# Patient Record
(demographics unavailable — no encounter records)

---

## 2025-01-29 NOTE — DISCUSSION/SUMMARY
[de-identified] : Patient is a 70 year old male who presents for follow up of Left TKA from 12/23 and MARI from April 2024.  His implants are stable on x-ray. He does report some occasional discomfort and subjective stiffness with terminal knee flexion. Pt understands the importance of prophylaxis for invasive dental procedures. He should continue to do low impact exercises. F/u 5 years from surgery.

## 2025-01-29 NOTE — HISTORY OF PRESENT ILLNESS
[Improving] : improving [2] : a current pain level of 2/10 [1] : a minimum pain level of 1/10 [4] : a maximum pain level of 4/10 [de-identified] : Patient is a 70 year old male who presents for follow up of Left TKA from 12/23 and MARI from April 2024.  At this point, he states that he is able to perform his ADLs.  However, he does report some intermittent mild dull aching pain over the anterior medial and anterior lateral aspects of the knee.  Associated with some residual stiffness with terminal flexion.  He denies any complaints of instability.  No significant swelling, warmth or erythema of the knee.  No radicular pain or paresthesia.

## 2025-01-29 NOTE — PHYSICAL EXAM
[LE] : Sensory: Intact in bilateral lower extremities [ALL] : dorsalis pedis, posterior tibial, femoral, popliteal, and radial 2+ and symmetric bilaterally [Normal] : Alert and in no acute distress [Poor Appearance] : well-appearing [de-identified] : GENERAL APPEARANCE: Well nourished and hydrated, pleasant, alert, and oriented x 3. Appears their stated age. HEENT: Normocephalic, extraocular eye motion intact. Nasal septum midline. Oral cavity clear. External auditory canal clear. RESPIRATORY: Breath sounds clear and audible in all lobes. No wheezing, No accessory muscle use. CARDIOVASCULAR: No apparent abnormalities. No lower leg edema. No varicosities. Pedal pulses are palpable. NEUROLOGIC: Sensation is normal, no muscle weakness in the upper or lower extremities. DERMATOLOGIC: No apparent skin lesions, moist, warm, no rash. SPINE: Cervical spine appears normal and moves freely; thoracic spine appears normal and moves freely; lumbosacral spine appears normal and moves freely, normal, nontender. MUSCULOSKELETAL: Hands, wrists, and elbows are normal and move freely, shoulders are normal and move freely. [de-identified] : Left knee exam shows healed incision with no sign of infection.  No joint effusion, ROM 0-120, no gross instability, 5 out of 5 strength. [de-identified] : X rays were taken of the LEFT knee AP, LATERAL, SUNRISE VIEWS. No fracture, dislocation or loose body.   There is evidence of left total knee arthroplasty with implants in good position without evidence of loosening, wear or subsidence.

## 2025-01-29 NOTE — REASON FOR VISIT
[Follow-Up Visit] : a follow-up visit for [Other: ____] : [unfilled] [FreeTextEntry2] : s/p Left knee manipulation under anesthesia.DOS:04/02/24 POS: Left total knee arthroplasty.  [employer] Augmented Reality-assisted total knee arthroplasty. DOS: 12/14/2023.

## 2025-04-16 NOTE — ASSESSMENT
[FreeTextEntry1] : Annual physical: f/u routine labwork Benign neoplasm colon/hx of colon polyps: f/u GI DOLORES: c/w cpap, f/u pulmonology Obesity: Recommend low fat diet, wt loss, exercise, and DASH diet. CAD: s/p stents 2016, c/w aspirin as prescribed, f/u cardiology HTN: bp reviewed, c/w valsartan 80 mg po daily, low salt diet HLD: Recommend low fat diet, wt loss, exercise, nutritional counseling provided, f/u lipid panel ordered, c/w statin therapy Prediabetes: Recommend low carb diet, wt loss, exercise, f/u a1c ordered RTC 3 wks [Vaccines Reviewed] : Immunizations reviewed today. Please see immunization details in the vaccine log within the immunization flowsheet.

## 2025-04-16 NOTE — HISTORY OF PRESENT ILLNESS
[FreeTextEntry1] : NP-CPE [de-identified] : 71 yo male presents for annual physical. Reports feeling well. Denies fever, chills, cp, palpitations, sob, nvcd.

## 2025-04-16 NOTE — HEALTH RISK ASSESSMENT
[Very Good] : ~his/her~  mood as very good [Yes] : Yes [Monthly or less (1 pt)] : Monthly or less (1 point) [1 or 2 (0 pts)] : 1 or 2 (0 points) [Never (0 pts)] : Never (0 points) [No] : In the past 12 months have you used drugs other than those required for medical reasons? No [No falls in past year] : Patient reported no falls in the past year [0] : 2) Feeling down, depressed, or hopeless: Not at all (0) [PHQ-2 Negative - No further assessment needed] : PHQ-2 Negative - No further assessment needed [Audit-CScore] : 1 [de-identified] : needs improvement, walks [de-identified] : needs improvement [[id number]] : 0 [Patient reported colonoscopy was abnormal] : Patient reported colonoscopy was abnormal [HIV test declined] : HIV test declined [Hepatitis C test declined] : Hepatitis C test declined [Language] : denies difficulty with language [Behavior] : denies difficulty with behavior [Learning/Retaining New Information] : denies difficulty learning/retaining new information [Handling Complex Tasks] : denies difficulty handling complex tasks [Reasoning] : denies difficulty with reasoning [Spatial Ability and Orientation] : denies difficulty with spatial ability and orientation [With Family] : lives with family [Employed] : employed [] :  [Sexually Active] : sexually active [High Risk Behavior] : no high risk behavior [Feels Safe at Home] : Feels safe at home [Fully functional (bathing, dressing, toileting, transferring, walking, feeding)] : Fully functional (bathing, dressing, toileting, transferring, walking, feeding) [Fully functional (using the telephone, shopping, preparing meals, housekeeping, doing laundry, using] : Fully functional and needs no help or supervision to perform IADLs (using the telephone, shopping, preparing meals, housekeeping, doing laundry, using transportation, managing medications and managing finances) [Reports changes in hearing] : Reports no changes in hearing [Reports changes in vision] : Reports no changes in vision [Reports changes in dental health] : Reports no changes in dental health [ColonoscopyDate] : 09/23 [ColonoscopyComments] : polyp repeat 5 years- Dr. Artur Wang  [Patient/Caregiver unclear of wishes] : , patient/caregiver unclear of wishes [Never] : Never

## 2025-04-16 NOTE — CURRENT MEDS
[Takes medication as prescribed] : takes [Opioids] : opioids [FreeTextEntry1] : Patient is currently not on opioids.